# Patient Record
Sex: FEMALE | Race: WHITE | NOT HISPANIC OR LATINO | Employment: FULL TIME | URBAN - METROPOLITAN AREA
[De-identification: names, ages, dates, MRNs, and addresses within clinical notes are randomized per-mention and may not be internally consistent; named-entity substitution may affect disease eponyms.]

---

## 2018-11-16 ENCOUNTER — TRANSCRIBE ORDERS (OUTPATIENT)
Dept: ADMINISTRATIVE | Facility: HOSPITAL | Age: 43
End: 2018-11-16

## 2018-11-16 DIAGNOSIS — N64.9 LESION OF BREAST: ICD-10-CM

## 2018-11-16 DIAGNOSIS — R52 PAIN: Primary | ICD-10-CM

## 2018-11-30 ENCOUNTER — HOSPITAL ENCOUNTER (OUTPATIENT)
Dept: MAMMOGRAPHY | Facility: HOSPITAL | Age: 43
Discharge: HOME | End: 2018-11-30

## 2018-11-30 ENCOUNTER — HOSPITAL ENCOUNTER (OUTPATIENT)
Dept: ULTRASOUND IMAGING | Facility: HOSPITAL | Age: 43
Discharge: HOME | End: 2018-11-30
Admitting: NURSE PRACTITIONER

## 2018-11-30 DIAGNOSIS — R52 PAIN: ICD-10-CM

## 2018-11-30 DIAGNOSIS — N64.9 LESION OF BREAST: ICD-10-CM

## 2018-11-30 PROCEDURE — 76642 ULTRASOUND BREAST LIMITED: CPT

## 2018-11-30 PROCEDURE — 77066 DX MAMMO INCL CAD BI: CPT

## 2022-02-09 ENCOUNTER — APPOINTMENT (OUTPATIENT)
Dept: WOMENS IMAGING | Facility: HOSPITAL | Age: 47
End: 2022-02-09

## 2022-02-09 PROCEDURE — 77067 SCR MAMMO BI INCL CAD: CPT | Performed by: RADIOLOGY

## 2022-02-09 PROCEDURE — 77063 BREAST TOMOSYNTHESIS BI: CPT | Performed by: RADIOLOGY

## 2022-03-07 ENCOUNTER — APPOINTMENT (OUTPATIENT)
Dept: WOMENS IMAGING | Facility: HOSPITAL | Age: 47
End: 2022-03-07

## 2022-03-07 PROCEDURE — 77065 DX MAMMO INCL CAD UNI: CPT | Performed by: RADIOLOGY

## 2022-03-07 PROCEDURE — 76641 ULTRASOUND BREAST COMPLETE: CPT | Performed by: RADIOLOGY

## 2022-03-07 PROCEDURE — G0279 TOMOSYNTHESIS, MAMMO: HCPCS | Performed by: RADIOLOGY

## 2022-03-07 PROCEDURE — 77061 BREAST TOMOSYNTHESIS UNI: CPT | Performed by: RADIOLOGY

## 2024-01-04 ENCOUNTER — OFFICE VISIT (OUTPATIENT)
Dept: OTOLARYNGOLOGY | Facility: CLINIC | Age: 49
End: 2024-01-04
Payer: COMMERCIAL

## 2024-01-04 VITALS — TEMPERATURE: 98 F | BODY MASS INDEX: 25.43 KG/M2 | HEIGHT: 67 IN | WEIGHT: 162 LBS

## 2024-01-04 DIAGNOSIS — R49.0 HOARSENESS: Primary | ICD-10-CM

## 2024-01-04 DIAGNOSIS — Z87.891 HISTORY OF TOBACCO USE: ICD-10-CM

## 2024-01-04 DIAGNOSIS — H93.13 TINNITUS OF BOTH EARS: ICD-10-CM

## 2024-01-04 DIAGNOSIS — R22.1 NECK FULLNESS: ICD-10-CM

## 2024-01-04 PROCEDURE — 99203 OFFICE O/P NEW LOW 30 MIN: CPT | Performed by: OTOLARYNGOLOGY

## 2024-01-04 PROCEDURE — 31575 DIAGNOSTIC LARYNGOSCOPY: CPT | Performed by: OTOLARYNGOLOGY

## 2024-01-04 RX ORDER — ESCITALOPRAM OXALATE 10 MG/1
1 TABLET ORAL DAILY
COMMUNITY
Start: 2023-10-30

## 2024-01-04 RX ORDER — BUSPIRONE HYDROCHLORIDE 7.5 MG/1
7.5 TABLET ORAL 2 TIMES DAILY
COMMUNITY

## 2024-01-04 NOTE — PROGRESS NOTES
Patient Name: Kristine Flowers   Visit Date: 01/04/2024   Patient ID: [id number]  Provider: Lux Xavier MD    Sex: female  Location: [facility]   YOB: 1975  Location Address: [address] [place] [address], [address],?[place]?[address]    Primary Care Provider Pallavi Tomas, ERIK  Location Phone: [phone]    Referring Provider: No ref. provider found        Chief Complaint  Hoarseness/Thyroid nodules    History of Present Illness  Kristine Flowers is a 48 y.o. female who presents to [facility] today as a consult from No ref. provider found for evaluation of her throat.  She tells me that for quite some time now she has noticed a fullness sensation to the right side of her neck.  She points to the level 2 and 3 when asked where she experiences the sensation.  She denies any sore throat or dysphagia.  She does mention hoarseness which typically occurs after speaking for 20 or 30 minutes.  This is often associated with a sensation of tightness in the midline of her neck.  She has not experienced any aphonia.  She does report occasional cough when her throat feels dry.  She does not strain her voice but does use it frequently for work.  She has not experienced any issues with rhinorrhea, nasal congestion, or GERD.  She does mention occasional frontal headaches and a history of migraines.  She also mentions bilateral high-pitched tinnitus which has been present for approximately 5 years.  She denies any hearing concerns.  She does not sleep well.  She does not drink an excessive amount of caffeine.  She is a former smoker who quit 3 years ago.  She underwent an ultrasound of the neck on 11/9/2023 at [facility] which revealed a 1.8 x 1.5 x 0.6 cm ovoid hypoechoic structure in the area of palpable finding.  No echogenic hilum was noted.  CT scan of the neck with contrast on 11/20/2023 revealed a right level two 0.6 x 1.2 cm lymph  "node without suspicious features and a 0.8 cm right thyroid nodule.    Past Medical History:   Diagnosis Date    Disease of thyroid gland        Past Surgical History:   Procedure Laterality Date    BUNIONECTOMY      HERNIA REPAIR      HYSTERECTOMY           Current Outpatient Medications:     escitalopram (LEXAPRO) 10 MG tablet, Take 1 tablet by mouth Daily., Disp: , Rfl:     busPIRone (BUSPAR) 7.5 MG tablet, Take 1 tablet by mouth 2 (Two) Times a Day., Disp: , Rfl:      No Known Allergies    Social History     Tobacco Use    Smoking status: Former     Packs/day: 1.00     Years: 20.00     Additional pack years: 0.00     Total pack years: 20.00     Types: Cigarettes     Quit date:      Years since quittin.0   Vaping Use    Vaping Use: Never used   Substance Use Topics    Alcohol use: Yes     Comment: Occ        Objective     Vital Signs:   Temp 98 °F (36.7 °C)   Ht 168.9 cm (66.5\")   Wt 73.5 kg (162 lb)   BMI 25.76 kg/m²       Physical Exam    General: Well developed, well nourished patient of stated age in no acute distress. Voice is strong and clear.   Head: Normocephalic and atraumatic.  Face: No lesions.  Bilateral parotid and submandibular glands are unremarkable.  Stensen's and Warthin's ducts are productive of clear saliva bilaterally.  House-Brackmann I/VI     bilaterally.   muscles and temporomandibular joint nontender to palpation.  No TMJ crepitus.  Eyes: PERRLA, sclerae anicteric, no conjunctival injection. Extraocular movements are intact and full. No nystagmus.   Ears: Auricles are normal in appearance. Bilateral external auditory canals are unremarkable. Bilateral tympanic membranes are clear and without effusion. Hearing normal to conversational voice.   Nose: External nose is normal in appearance. Bilateral nares are patent with normal appearing mucosa. Septum midline. Turbinates are unremarkable. No lesions.   Oral Cavity: Lips are normal in appearance. Oral mucosa is " unremarkable. Gingiva is unremarkable. Normal dentition for age. Tongue is unremarkable with good movement. Hard palate is unremarkable.   Oropharynx: Soft palate is unremarkable with full movement. Uvula is unremarkable. Bilateral tonsils are unremarkable. Posterior oropharynx is unremarkable.    Larynx and hypopharynx: Deferred secondary to gag reflex.  Neck: Supple.  No mass.  Nontender to palpation.  Trachea midline. Thyroid normal size and without nodules to palpation.   Lymphatic: No lymphadenopathy upon palpation.  Respiratory: Clear to auscultation bilaterally, nonlabored respirations    Cardiovascular: RRR, no murmurs, rubs, or gallops,   Psychiatric: Appropriate affect, cooperative   Neurologic: Oriented x 3, strength symmetric in all extremities, Cranial Nerves II-XII are grossly intact to confrontation   Skin: Warm and dry. No rashes.    Procedures   Procedure: Flexible fiberoptic nasolaryngoscopy.    Indications: Neck fullness and hoarseness in a former smoker.    Summary: The patient's bilateral nares were decongested and anesthetized with Afrin and lidocaine sprays respectively. After giving the medications ample time to take effect flexible fiberoptic scope was inserted in the patient's right naris revealing a normal-appearing nasal cavity and nasopharynx. The base of tongue, vallecula, epiglottis, aryepiglottic folds, and arytenoid cartilages are all normal-appearing aside from some edema and erythema of the arytenoids and posterior commissure. The piriform sinuses were normal in appearance. The bilateral false and true vocal folds are normal in appearance and adducted and abducted normally.  There is mild hyper functionality of the supraglottic structures.  The subglottis was widely patent. The patient tolerated the procedure well.        Result Review :               Assessment and Plan    Diagnoses and all orders for this visit:    1. Hoarseness (Primary)    2. Neck fullness    3. Tinnitus of  both ears    4. History of tobacco use      Impressions and findings were discussed at great length.  Currently, she is seen for evaluation of right-sided neck fullness and hoarseness with voice use.  Examination today reveals mild hyper functionality of the supraglottic structures upon phonation signs concerning for irritation secondary to reflux versus allergies.  We reviewed and discussed the results of her 11/9/2023 ultrasound in 11/20/2023 CT scan of the neck with contrast.  The images were not available today.  We discussed that there is no evidence of mass or lesion.  We discussed the remainder of the differential musculoskeletal causes including muscle tension dysphonia, allergies, and reflux.  That she obtain a CD with the images from her CT scan.  We also discussed conservative measures for management as well as the possibility of referral to a speech and language pathologist.  At this time, she will follow-up in 6 weeks with her images or sooner if needed.      Follow Up   Return in about 6 weeks (around 2/15/2024).  Patient was given instructions and counseling regarding her condition or for health maintenance advice. Please see specific information pulled into the AVS if appropriate.

## 2024-02-22 ENCOUNTER — OFFICE VISIT (OUTPATIENT)
Dept: OTOLARYNGOLOGY | Facility: CLINIC | Age: 49
End: 2024-02-22
Payer: COMMERCIAL

## 2024-02-22 VITALS — WEIGHT: 151 LBS | BODY MASS INDEX: 23.7 KG/M2 | HEIGHT: 67 IN | TEMPERATURE: 97.7 F

## 2024-02-22 DIAGNOSIS — R22.1 NECK FULLNESS: Primary | ICD-10-CM

## 2024-02-22 DIAGNOSIS — E04.1 THYROID NODULE: ICD-10-CM

## 2024-02-22 RX ORDER — FEXOFENADINE HCL 180 MG/1
180 TABLET ORAL DAILY
COMMUNITY

## 2024-02-22 NOTE — PROGRESS NOTES
Patient Name: Kristine Flowers   Visit Date: 02/22/2024   Patient ID: [id number]  Provider: Lux Xavier MD    Sex: female  Location: [facility]   YOB: 1975  Location Address: [address] [place] [address], [address],?[place]?[address]    Primary Care Provider Pallavi Tomas APRN  Location Phone: [phone]    Referring Provider: No ref. provider found        Chief Complaint  6 week follow up    History of Present Illness  1/4/2024:  Kristine Flowers is a 48 y.o. female who presents to [facility] today as a consult from No ref. provider found for evaluation of her throat.  She tells me that for quite some time now she has noticed a fullness sensation to the right side of her neck.  She points to the level 2 and 3 when asked where she experiences the sensation.  She denies any sore throat or dysphagia.  She does mention hoarseness which typically occurs after speaking for 20 or 30 minutes.  This is often associated with a sensation of tightness in the midline of her neck.  She has not experienced any aphonia.  She does report occasional cough when her throat feels dry.  She does not strain her voice but does use it frequently for work.  She has not experienced any issues with rhinorrhea, nasal congestion, or GERD.  She does mention occasional frontal headaches and a history of migraines.  She also mentions bilateral high-pitched tinnitus which has been present for approximately 5 years.  She denies any hearing concerns.  She does not sleep well.  She does not drink an excessive amount of caffeine.  She is a former smoker who quit 3 years ago.  She underwent an ultrasound of the neck on 11/9/2023 at [facility] which revealed a 1.8 x 1.5 x 0.6 cm ovoid hypoechoic structure in the area of palpable finding.  No echogenic hilum was noted.  CT scan of the neck with contrast on 11/20/2023 revealed a right level two 0.6 x 1.2 cm lymph  "node without suspicious features and a 0.8 cm right thyroid nodule.  Laryngoscopic examination that day revealed mild hyper functionality of the supraglottic structures upon phonation and signs concerning for irritation secondary to reflux versus allergies.  She was reassured that there was no concerning mass or lesion.    2024:  She returns today for follow-up.  She tells me that she continues to experience a fullness sensation on the right side of her neck.  The sensation is again worse with talking.  Her hoarseness continues to fluctuate but she feels as though it has improved somewhat.  She denies any issues with dysphagia.  We reviewed the images from her CT scan of the neck with contrast from [facility] on 2023.  This again revealed a right 0.8 cm hypodense thyroid nodule and a right level two 1.2 x 0.6 cm mildly enhancing lymph node which is slightly larger than any surrounding lymph nodes.      Past Medical History:   Diagnosis Date    Disease of thyroid gland        Past Surgical History:   Procedure Laterality Date    BUNIONECTOMY      HERNIA REPAIR      HYSTERECTOMY           Current Outpatient Medications:     busPIRone (BUSPAR) 7.5 MG tablet, Take 1 tablet by mouth 2 (Two) Times a Day., Disp: , Rfl:     escitalopram (LEXAPRO) 10 MG tablet, Take 1 tablet by mouth Daily., Disp: , Rfl:     fexofenadine (Allegra Allergy) 180 MG tablet, Take 1 tablet by mouth Daily., Disp: , Rfl:      No Known Allergies    Social History     Tobacco Use    Smoking status: Former     Packs/day: 1.00     Years: 20.00     Additional pack years: 0.00     Total pack years: 20.00     Types: Cigarettes     Quit date:      Years since quittin.1   Vaping Use    Vaping Use: Never used   Substance Use Topics    Alcohol use: Yes     Comment: Occ        Objective     Vital Signs:   Temp 97.7 °F (36.5 °C)   Ht 168.9 cm (66.5\")   Wt 68.5 kg (151 lb)   BMI 24.01 kg/m²       Physical Exam    General: Well " developed, well nourished patient of stated age in no acute distress. Voice is strong and clear.   Head: Normocephalic and atraumatic.  Face: No lesions.  Bilateral parotid and submandibular glands are unremarkable.  Stensen's and Warthin's ducts are productive of clear saliva bilaterally.  House-Brackmann I/VI     bilaterally.   muscles and temporomandibular joint nontender to palpation.  No TMJ crepitus.  Eyes: PERRLA, sclerae anicteric, no conjunctival injection. Extraocular movements are intact and full. No nystagmus.   Ears: Auricles are normal in appearance. Bilateral external auditory canals are unremarkable. Bilateral tympanic membranes are clear and without effusion. Hearing normal to conversational voice.   Nose: External nose is normal in appearance. Bilateral nares are patent with normal appearing mucosa. Septum midline. Turbinates are unremarkable. No lesions.   Oral Cavity: Lips are normal in appearance. Oral mucosa is unremarkable. Gingiva is unremarkable. Normal dentition for age. Tongue is unremarkable with good movement. Hard palate is unremarkable.   Oropharynx: Soft palate is unremarkable with full movement. Uvula is unremarkable. Bilateral tonsils are unremarkable. Posterior oropharynx is unremarkable.    Larynx and hypopharynx: Deferred secondary to gag reflex.  Neck: Supple.  No mass.  Nontender to palpation.  Trachea midline. Thyroid normal size and without nodules to palpation.   Lymphatic: No lymphadenopathy upon palpation.   Psychiatric: Appropriate affect, cooperative   Neurologic: Oriented x 3, strength symmetric in all extremities, Cranial Nerves II-XII are grossly intact to confrontation   Skin: Warm and dry. No rashes.    Procedures       Result Review :               Assessment and Plan    Diagnoses and all orders for this visit:    1. Neck fullness (Primary)  -     US Thyroid; Future    2. Thyroid nodule  -     US Thyroid; Future        Impressions and findings were  discussed at great length.  Currently, she is seen for follow-up of right-sided neck fullness and hoarseness with voice use.  Examination today is unremarkable.  We reviewed and discussed the images from her 11/20/2023 CT scan of the neck with contrast which revealed a right 0.8 cm hypodense thyroid nodule and a right level two 1.2 x 0.6 cm mildly enhancing lymph node which is slightly larger than any surrounding lymph nodes.  We discussed the pathophysiology and natural history of thyroid nodules.  Options for further evaluation were discussed and we will pursue an ultrasound of the thyroid.  I have asked that they also reimage the right level 2A lymph node.  She will follow-up after her thyroid ultrasound or sooner if needed.  She was given ample time to ask questions, all of which were answered to her satisfaction.    Follow Up   No follow-ups on file.  Patient was given instructions and counseling regarding her condition or for health maintenance advice. Please see specific information pulled into the AVS if appropriate.

## 2024-04-29 ENCOUNTER — HOSPITAL ENCOUNTER (OUTPATIENT)
Dept: ULTRASOUND IMAGING | Facility: HOSPITAL | Age: 49
Discharge: HOME | End: 2024-04-29
Admitting: OTOLARYNGOLOGY
Payer: COMMERCIAL

## 2024-04-29 DIAGNOSIS — R22.1 NECK FULLNESS: ICD-10-CM

## 2024-04-29 DIAGNOSIS — E04.1 THYROID NODULE: ICD-10-CM

## 2024-04-29 PROCEDURE — 76536 US EXAM OF HEAD AND NECK: CPT

## 2024-05-09 ENCOUNTER — OFFICE VISIT (OUTPATIENT)
Dept: OTOLARYNGOLOGY | Facility: CLINIC | Age: 49
End: 2024-05-09
Payer: COMMERCIAL

## 2024-05-09 VITALS
HEIGHT: 67 IN | WEIGHT: 158 LBS | BODY MASS INDEX: 24.8 KG/M2 | OXYGEN SATURATION: 98 % | TEMPERATURE: 97.4 F | HEART RATE: 91 BPM

## 2024-05-09 DIAGNOSIS — R49.0 HOARSENESS: ICD-10-CM

## 2024-05-09 DIAGNOSIS — E04.1 THYROID NODULE: Primary | ICD-10-CM

## 2024-05-09 DIAGNOSIS — R22.1 NECK FULLNESS: ICD-10-CM

## 2024-05-09 RX ORDER — RIMEGEPANT SULFATE 75 MG/75MG
TABLET, ORALLY DISINTEGRATING ORAL
COMMUNITY
Start: 2024-04-29

## 2024-05-09 RX ORDER — ESTRADIOL 0.05 MG/D
PATCH, EXTENDED RELEASE TRANSDERMAL
COMMUNITY
Start: 2024-04-29

## 2025-05-14 DIAGNOSIS — E04.1 THYROID NODULE: Primary | ICD-10-CM

## 2025-05-28 ENCOUNTER — HOSPITAL ENCOUNTER (OUTPATIENT)
Dept: ULTRASOUND IMAGING | Facility: HOSPITAL | Age: 50
Discharge: HOME | End: 2025-05-28
Admitting: OTOLARYNGOLOGY
Payer: COMMERCIAL

## 2025-05-28 DIAGNOSIS — E04.1 THYROID NODULE: ICD-10-CM

## 2025-05-28 PROCEDURE — 76536 US EXAM OF HEAD AND NECK: CPT

## 2025-06-12 ENCOUNTER — OFFICE VISIT (OUTPATIENT)
Dept: OTOLARYNGOLOGY | Facility: CLINIC | Age: 50
End: 2025-06-12
Payer: COMMERCIAL

## 2025-06-12 VITALS
HEART RATE: 80 BPM | SYSTOLIC BLOOD PRESSURE: 118 MMHG | OXYGEN SATURATION: 100 % | DIASTOLIC BLOOD PRESSURE: 75 MMHG | TEMPERATURE: 98.2 F

## 2025-06-12 DIAGNOSIS — Z87.891 HISTORY OF TOBACCO USE: ICD-10-CM

## 2025-06-12 DIAGNOSIS — E04.1 THYROID NODULE: Primary | ICD-10-CM

## 2025-06-12 DIAGNOSIS — H93.13 TINNITUS OF BOTH EARS: ICD-10-CM

## 2025-06-12 PROCEDURE — 99213 OFFICE O/P EST LOW 20 MIN: CPT | Performed by: OTOLARYNGOLOGY

## 2025-06-12 RX ORDER — ESTRADIOL 0.07 MG/D
PATCH, EXTENDED RELEASE TRANSDERMAL
COMMUNITY
Start: 2025-05-19

## 2025-06-12 RX ORDER — DESVENLAFAXINE 25 MG/1
25 TABLET, EXTENDED RELEASE ORAL DAILY
COMMUNITY

## 2025-06-12 RX ORDER — PROGESTERONE 100 MG/1
100 CAPSULE ORAL DAILY
COMMUNITY

## 2025-06-12 NOTE — PROGRESS NOTES
Patient Name: Kristine Flowers   Visit Date: 06/12/2025   Patient ID: [id number]  Provider: uLx Xavier MD    Sex: female  Location: [facility]   YOB: 1975  Location Address: [address] [place] [address], [address],?[place]?[address]    Primary Care Provider Pallavi Tomas APRN  Location Phone: [phone]    Referring Provider: No ref. provider found        Chief Complaint  1 year follow up w/ US    History of Present Illness  1/4/2024:  Kristine Flowers is a 49 y.o. female who presents to [facility] today as a consult from No ref. provider found for evaluation of her throat.  She tells me that for quite some time now she has noticed a fullness sensation to the right side of her neck.  She points to the level 2 and 3 when asked where she experiences the sensation.  She denies any sore throat or dysphagia.  She does mention hoarseness which typically occurs after speaking for 20 or 30 minutes.  This is often associated with a sensation of tightness in the midline of her neck.  She has not experienced any aphonia.  She does report occasional cough when her throat feels dry.  She does not strain her voice but does use it frequently for work.  She has not experienced any issues with rhinorrhea, nasal congestion, or GERD.  She does mention occasional frontal headaches and a history of migraines.  She also mentions bilateral high-pitched tinnitus which has been present for approximately 5 years.  She denies any hearing concerns.  She does not sleep well.  She does not drink an excessive amount of caffeine.  She is a former smoker who quit 3 years ago.  She underwent an ultrasound of the neck on 11/9/2023 at [facility] which revealed a 1.8 x 1.5 x 0.6 cm ovoid hypoechoic structure in the area of palpable finding.  No echogenic hilum was noted.  CT scan of the neck with contrast on 11/20/2023 revealed a right level two 0.6 x 1.2 cm  lymph node without suspicious features and a 0.8 cm right thyroid nodule.  Laryngoscopic examination that day revealed mild hyper functionality of the supraglottic structures upon phonation and signs concerning for irritation secondary to reflux versus allergies.  She was reassured that there was no concerning mass or lesion.    2/22/2024:  She returns today for follow-up.  She tells me that she continues to experience a fullness sensation on the right side of her neck.  The sensation is again worse with talking.  Her hoarseness continues to fluctuate but she feels as though it has improved somewhat.  She denies any issues with dysphagia.  We reviewed the images from her CT scan of the neck with contrast from [facility] on 11/20/2023.  This again revealed a right 0.8 cm hypodense thyroid nodule and a right level two 1.2 x 0.6 cm mildly enhancing lymph node which is slightly larger than any surrounding lymph nodes.    5/09/2024:  She returns today for follow-up.  She tells me that she has done well since her last appointment.  She has not noticed a fullness sensation on the right side of her neck or hoarseness with talking more recently.  Thyroid ultrasound on 4/29/2024 revealed a right 1.3 x 0.9 cm isoechoic nodule with unremarkable appearing lymph nodes.    6/12/2025:  She returns today for follow-up.  She tells me that she has done well since her last appointment.  She denies any compressive symptoms.  She reports continued tinnitus.  She did recently start hormone replacement therapy.  Thyroid ultrasound on 5/28/2025 revealed a right mostly isoechoic 1.2 cm nodule with a small cystic component.  There is a right 1.7 x 0.4 cm lymph node with normal fatty hilum which has decreased in size slightly.  There is no thyroid function testing available to me today.      Past Medical History:   Diagnosis Date    Disease of thyroid gland        Past Surgical History:   Procedure Laterality Date    BUNIONECTOMY       HERNIA REPAIR      HYSTERECTOMY           Current Outpatient Medications:     Desvenlafaxine Succinate ER 25 MG tablet sustained-release 24 hour, Take 1 tablet by mouth Daily., Disp: , Rfl:     fexofenadine (Allegra Allergy) 180 MG tablet, Take 1 tablet by mouth Daily., Disp: , Rfl:     Lyllana 0.075 MG/24HR patch, USE 1 PATCH TWICE WEEKLY, Disp: , Rfl:     Progesterone (PROMETRIUM) 100 MG capsule, Take 1 capsule by mouth Daily., Disp: , Rfl:     Rimegepant Sulfate (Nurtec) 75 MG tablet dispersible tablet, Take by oral route for 8 days. (Patient taking differently: 1 tablet Daily As Needed.), Disp: , Rfl:     TESTOSTERONE TD, Place 174 each on the skin as directed by provider Daily. 1percent-apply to back of thigh daily, Disp: , Rfl:     estradiol (MINIVELLE, VIVELLE-DOT) 0.05 MG/24HR patch, Change patch every 72 hours., Disp: , Rfl:      No Known Allergies    Social History     Tobacco Use    Smoking status: Former     Current packs/day: 0.00     Average packs/day: 1 pack/day for 20.0 years (20.0 ttl pk-yrs)     Types: Cigarettes     Start date:      Quit date: 2020     Years since quittin.4   Vaping Use    Vaping status: Never Used   Substance Use Topics    Alcohol use: Yes     Comment: Occ        Objective     Vital Signs:   /75   Pulse 80   Temp 98.2 °F (36.8 °C)   SpO2 100%       Physical Exam    General: Well developed, well nourished patient of stated age in no acute distress. Voice is strong and clear.   Head: Normocephalic and atraumatic.  Face: No lesions.  Bilateral parotid and submandibular glands are unremarkable.  Stensen's and Warthin's ducts are productive of clear saliva bilaterally.  House-Brackmann I/VI     bilaterally.   muscles and temporomandibular joint nontender to palpation.  No TMJ crepitus.  Eyes: PERRLA, sclerae anicteric, no conjunctival injection. Extraocular movements are intact and full. No nystagmus.   Ears: Auricles are normal in appearance. Bilateral  external auditory canals are unremarkable. Bilateral tympanic membranes are clear and without effusion. Hearing normal to conversational voice.   Nose: External nose is normal in appearance. Bilateral nares are patent with normal appearing mucosa. Septum midline. Turbinates are unremarkable. No lesions.   Oral Cavity: Lips are normal in appearance. Oral mucosa is unremarkable. Gingiva is unremarkable. Normal dentition for age. Tongue is unremarkable with good movement. Hard palate is unremarkable.   Oropharynx: Soft palate is unremarkable with full movement. Uvula is unremarkable. Bilateral tonsils are unremarkable. Posterior oropharynx is unremarkable.    Larynx and hypopharynx: Deferred secondary to gag reflex.  Neck: Supple.  No mass.  Nontender to palpation.  Trachea midline. Thyroid normal size and without nodules to palpation.   Lymphatic: No lymphadenopathy upon palpation.   Psychiatric: Appropriate affect, cooperative   Neurologic: Oriented x 3, strength symmetric in all extremities, Cranial Nerves II-XII are grossly intact to confrontation   Skin: Warm and dry. No rashes.    Procedures       Result Review :               Assessment and Plan    Diagnoses and all orders for this visit:    1. Thyroid nodule (Primary)  -     US Thyroid; Future    2. History of tobacco use    3. Tinnitus of both ears        Impressions and findings were discussed at great length.  Currently, she continues to do well.  We reviewed and discussed the images from her 5/28/2025 thyroid ultrasound and compared them to 4/29/2024 thyroid ultrasound.  This revealed stable appearing right-sided mixed density nodule.  We discussed the pathophysiology and natural history of thyroid nodules.  Options for management were discussed and we will pursue continued observation with a repeat ultrasound in 1 year or sooner if needed.  She was given ample time to ask questions, all of which were answered to her satisfaction.    Follow Up   Return in  about 1 year (around 6/12/2026).  Patient was given instructions and counseling regarding her condition or for health maintenance advice. Please see specific information pulled into the AVS if appropriate.